# Patient Record
Sex: MALE | Race: WHITE | NOT HISPANIC OR LATINO | Employment: OTHER | URBAN - METROPOLITAN AREA
[De-identification: names, ages, dates, MRNs, and addresses within clinical notes are randomized per-mention and may not be internally consistent; named-entity substitution may affect disease eponyms.]

---

## 2017-02-03 ENCOUNTER — GENERIC CONVERSION - ENCOUNTER (OUTPATIENT)
Dept: OTHER | Facility: OTHER | Age: 62
End: 2017-02-03

## 2017-03-09 ENCOUNTER — ALLSCRIPTS OFFICE VISIT (OUTPATIENT)
Dept: OTHER | Facility: OTHER | Age: 62
End: 2017-03-09

## 2017-04-23 ENCOUNTER — APPOINTMENT (OUTPATIENT)
Dept: POPULATION HEALTH | Facility: CLINIC | Age: 62
End: 2017-04-23
Payer: COMMERCIAL

## 2017-04-23 ENCOUNTER — OUTPATIENT (OUTPATIENT)
Dept: OUTPATIENT SERVICES | Facility: HOSPITAL | Age: 62
LOS: 1 days | End: 2017-04-23
Payer: COMMERCIAL

## 2017-04-23 DIAGNOSIS — Z77.090 CONTACT WITH AND (SUSPECTED) EXPOSURE TO ASBESTOS: ICD-10-CM

## 2017-04-23 PROCEDURE — 93000 ELECTROCARDIOGRAM COMPLETE: CPT

## 2017-04-23 PROCEDURE — 36415 COLL VENOUS BLD VENIPUNCTURE: CPT

## 2017-04-23 PROCEDURE — 71250 CT THORAX DX C-: CPT

## 2017-04-23 PROCEDURE — 99212 OFFICE O/P EST SF 10 MIN: CPT | Mod: 25

## 2017-04-23 PROCEDURE — 94010 BREATHING CAPACITY TEST: CPT

## 2017-04-23 PROCEDURE — 71250 CT THORAX DX C-: CPT | Mod: 26

## 2017-04-27 ENCOUNTER — ALLSCRIPTS OFFICE VISIT (OUTPATIENT)
Dept: OTHER | Facility: OTHER | Age: 62
End: 2017-04-27

## 2017-04-27 LAB — HBA1C MFR BLD HPLC: 6 %

## 2017-05-17 ENCOUNTER — ALLSCRIPTS OFFICE VISIT (OUTPATIENT)
Dept: OTHER | Facility: OTHER | Age: 62
End: 2017-05-17

## 2017-05-22 ENCOUNTER — ALLSCRIPTS OFFICE VISIT (OUTPATIENT)
Dept: OTHER | Facility: OTHER | Age: 62
End: 2017-05-22

## 2017-06-13 ENCOUNTER — GENERIC CONVERSION - ENCOUNTER (OUTPATIENT)
Dept: OTHER | Facility: OTHER | Age: 62
End: 2017-06-13

## 2017-08-03 ENCOUNTER — ALLSCRIPTS OFFICE VISIT (OUTPATIENT)
Dept: OTHER | Facility: OTHER | Age: 62
End: 2017-08-03

## 2017-08-03 LAB — HBA1C MFR BLD HPLC: 5.6 %

## 2017-08-14 ENCOUNTER — GENERIC CONVERSION - ENCOUNTER (OUTPATIENT)
Dept: OTHER | Facility: OTHER | Age: 62
End: 2017-08-14

## 2017-10-09 ENCOUNTER — ALLSCRIPTS OFFICE VISIT (OUTPATIENT)
Dept: OTHER | Facility: OTHER | Age: 62
End: 2017-10-09

## 2017-10-10 NOTE — PROGRESS NOTES
Assessment  1  Abscess of skin (682 9) (L02 91)    Plan  Abscess of skin    · Cefadroxil 500 MG Oral Capsule; TAKE 1 CAPSULE TWICE DAILY   · Follow Up if Not Better Evaluation and Treatment  Follow-up  Status: Complete  Done:  77YNB7037 04:28PM   · Gently wash the area with soap and warm water  Dry completely ; Status:Complete;    Done: 69QSQ4805 04:28PM   · Good hand washing is one of the best ways to control the spread of germs ;  Status:Complete;   Done: 91FHO8919 04:28PM   · Remove the dressing in 1 day ; Status:Complete;   Done: 22OAX8442 04:28PM   · Use warm packs 4 times a day for 15 minutes ; Status:Complete;   Done: 88LZE2691  04:28PM   · Call [phone] if: Your temperature is higher than 101F ; Status:Complete;   Done:  71WWH8519 04:28PM   · Seek Immediate Medical Attention if: You have signs of infection in or around the affected  area ; Status:Complete;   Done: 72XRM9382 04:28PM    Chief Complaint  Patient is here today for a cyst on his back, L  Brenner/LPN      History of Present Illness  Abscess, Subcutaneous (Brief): The patient is being seen for an initial evaluation of a subcutaneous abscess  Symptoms:  abscess drainage  The patient is currently asymptomatic  No associated symptoms are reported  The patient is not currently being treated for this problem  Pertinent medical history:  no diabetes mellitus,-no hidradenitis suppurativa,-no HIV infection,-no impaired immunity-and-no injection drug use  Review of Systems    Constitutional: no fever or chills, feels well, no tiredness, no recent weight loss or weight gain  Integumentary: as noted in HPI  Active Problems  1  Abscess of back (682 2) (L02 212)   2  Abscess of skin (682 9) (L02 91)   3  Closed fracture of one rib of right side, initial encounter (807 01) (S22 31XA)   4  Contusion of right shoulder, initial encounter (923 00) (S40 011A)   5  Dizziness (780 4) (R42)   6  Elevated glucose (790 29) (R73 09)   7   Essential hypertension (401  9) (I10)   8  Heart murmur (785 2) (R01 1)   9  Hip fracture, left (820 8) (S72 002A)   10  Hip fracture, right (820 8) (S72 001A)   11  Hypertension (401 9) (I10)   12  Injury of right shoulder (959 2) (S49 91XA)   13  Laceration of elbow, left (881 01) (S51 012A)   14  Left hip pain (719 45) (M25 552)   15  Need for immunization against influenza (V04 81) (Z23)   16  Preoperative clearance (V72 84) (Z01 818)   17  Progressive supranuclear palsy (333 0) (G23 1)   18  Screening for diabetic retinopathy (V80 2) (Z13 5)   19  Sebaceous cyst (706 2) (L72 3)   20  Shoulder injury, right, initial encounter (959 2) (S49 91XA)   21  Visit for suture removal (V58 32) (Z48 02)    Past Medical History  1  History of Bilateral inguinal hernia without obstruction or gangrene, recurrence not   specified (550 92) (K40 20)   2  History of Colon cancer screening (V76 51) (Z12 11)   3  History of Hordeolum externum (373 11) (H00 019)   4  History of Sprain of shoulder and upper arm (840 9) (S43 409A)   5  History of Upper respiratory infection (465 9) (J06 9)  Active Problems And Past Medical History Reviewed: The active problems and past medical history were reviewed and updated today  Family History  Mother    1  Family history of Hypertension  Father    2  Family history of    3  Family history of Heart attack  Family History    4  No family history of mental disorder  Family History Reviewed: The family history was reviewed and updated today  Social History   · Cultural background   · NON-   · Drinks coffee   · Exercises moderately 3 or more times a week   ·    · Never a smoker   · No drug use   · Occasional alcohol use   · Primary spoken language English   · Seeing a dentist   · Social alcohol use (Z78 9)  The social history was reviewed and updated today  Surgical History  1  History of Hernia Repair   2  History of Hip Replacement Left   3  History of Hip Replacement Right   4  History of Spinal Diskectomy   5  History of Spinal Laminotomy   6  History of Treatment Of Hip Fracture  Surgical History Reviewed: The surgical history was reviewed and updated today  Current Meds   1  Ecotrin 325 MG Oral Tablet Delayed Release; Take 1 tablet daily; Therapy: (Recorded:09Mar2017) to Recorded   2  Lotrel 5-20 MG Oral Capsule; TAKE 1 CAPSULE Bedtime; Therapy: (Recorded:30Jan2017) to Recorded   3  Multi-Vitamin TABS; TAKE 1 TABLET DAILY; Therapy: (Recorded:30Jan2017) to Recorded   4  Nuedexta 20-10 MG Oral Capsule; Take 1 capsule twice daily; Therapy: (Recorded:09Mar2017) to Recorded   5  OneTouch Verio In [employer]; TEST 4 TIMES DAILY; Therapy: 68UEJ7518 to (Evaluate:44Dmf1424)  Requested for: 56EYF1962; Last   Rx:[id number] Ordered   6  OneTouch Verio In [employer]; TEST 4 TIMES DAILY; Therapy: 48RTA8814 to (Last Rx:70Dmd2456) Ordered   7  Vitamin D3 1000 UNIT Oral Capsule; take 1 capsule daily; Therapy: (Recorded:30Jan2017) to Recorded    The medication list was reviewed and updated today  Allergies  1  No Known Drug Allergies    Vitals   Recorded: 65RQE7770 03:50PM   Temperature 99 2 F   Heart Rate 88, L Radial   Pulse Quality Normal, L Radial   Respiration Quality Normal   Respiration 16   Systolic 209, RUE, Sitting   Diastolic 70, RUE, Sitting   Height 6 ft    O2 Saturation 95     Physical Exam    Constitutional   General appearance: No acute distress, well appearing and well nourished  Skin   Skin and subcutaneous tissue: Abnormal  -Very small abscess left upper posterior thorax     Psychiatric   Orientation to person, place and time: Normal     Mood and affect: Normal          Signatures   Electronically signed by : Garrett Leahy MD; Oct  9 2017  4:31PM EST                       (Author)

## 2018-01-11 NOTE — RESULT NOTES
Message   Please notify lyme negative  FS     Verified Results  (LC) Lyme, Western Blot, Serum 04Vgq8155 09:46AM Clotilda Lack     Test Name Result Flag Reference   IgG P93 Ab  Absent     IgG P66 Ab  Absent     IgG P58 Ab  Absent     IgG P45 Ab  Absent     IgG P41 Ab  Absent     IgG P39 Ab  Absent     IgM P39 Ab  Absent     IgM P23 Ab  Absent     Lyme IgM WB Interp  Negative     Note: An equivocal or positive EIA result followed by a negative  Western Blot result is considered NEGATIVE  An equivocal or positive  EIA result followed by a positive Western Blot is considered POSITIVE  by the CDC  Positive: 2 of the following bands: 23,39 or 41  Negative: No bands or banding patterns which do not meet positive  criteria  Criteria for positivity are those recommended by CDC/ASTPHLD   p23=Osp C, t26=fdiltzkxt  Note:  Sera from individuals with the following may cross react in the  Lyme Western Blot assays: other spirochetal diseases (periodontal  disease, leptospirosis, relapsing fever, yaws, and pinta);  connective autoimmune (Rheumatoid Arthritis and Systemic Lupus  Erythematosus and also individuals with Antinuclear Antibody);  other infections COFFEE [facility] Spotted Fever; Elvin-Barr Virus,  and Cytomegalovirus)  IgG P30 Ab  Absent     IgG P28 Ab  Absent     IgG P23 Ab  Absent     IgG P18 Ab  Absent     Lyme IgG WB Interp  Negative     Positive: 5 of the following                                                Borrelia-specific bands:                                                18,23,28,30,39,41,45,58,                                                66, and 93  Negative: No bands or banding                                                patterns which do not                                                meet positive criteria  IgM P41 Ab   Absent

## 2018-01-12 VITALS
RESPIRATION RATE: 16 BRPM | SYSTOLIC BLOOD PRESSURE: 144 MMHG | OXYGEN SATURATION: 97 % | HEART RATE: 90 BPM | BODY MASS INDEX: 27.09 KG/M2 | TEMPERATURE: 98 F | WEIGHT: 200 LBS | HEIGHT: 72 IN | DIASTOLIC BLOOD PRESSURE: 70 MMHG

## 2018-01-12 VITALS
SYSTOLIC BLOOD PRESSURE: 130 MMHG | DIASTOLIC BLOOD PRESSURE: 82 MMHG | WEIGHT: 204 LBS | TEMPERATURE: 98.9 F | HEART RATE: 96 BPM | RESPIRATION RATE: 20 BRPM

## 2018-01-12 NOTE — PROGRESS NOTES
Assessment    1  Diabetes mellitus (250 00) (E11 9)    Plan  Diabetes mellitus    · Follow-up visit in 3 months Evaluation and Treatment  Follow-up  Status: Hold For -  Scheduling  Requested for: 34IUD1914   · Have your eyes examined by an eye doctor every year ; Status:Complete;   Done:  63IRF4893 01:37PM   · It is important to take good care of your feet if you have diabetes ; Status:Complete;    Done: 48OEH3636 01:37PM   · We recommend that you follow the "Mediterranean diet "; Status:Complete;   Done:  43YPY9264 01:37PM   · *VB - Eye Exam ;every 1 year; Last 35Kmz8711; Next 56HWD9784; Status:Active   · *VB - Foot Exam ;every 1 year; Next 69WHA5564; Status:Active    Chief Complaint  Patient is here today for a follow up on diabetes  Foot exam was done today  lb/lpn      History of Present Illness  The patient states he has been doing well with his Type II Diabetes control since the last visit  Comorbid Illnesses: hypertension  He has no known diabetic complications  He has no significant interval events  Symptoms: The patient is currently asymptomatic  Associated symptoms include no polyuria and no polydipsia  Home monitoring: Glycemic control has been good  the patient reports no symptomatic hypoglycemic episodes  Medications: the patient remains on diet control only without medications at this time  The patient is doing well with his diabetes goals  Due For: a hemoglobin A1c and a foot exam       Review of Systems    Cardiovascular: No complaints of slow heart rate, no fast heart rate, no chest pain, no palpitations, no leg claudication, no lower extremity  Respiratory: No complaints of shortness of breath, no wheezing, no cough, no SOB on exertion, no orthopnea or PND  Neurological: No compliants of headache, no confusion, no convulsions, no numbness or tingling, no dizziness or fainting, no limb weakness, no difficulty walking  Active Problems    1  Abscess of back (682 2) (L02 212)   2  Abscess of skin (682 9) (L02 91)   3  Closed fracture of one rib of right side, initial encounter (807 01) (S22 31XA)   4  Contusion of right shoulder, initial encounter (923 00) (S40 011A)   5  Diabetes mellitus (250 00) (E11 9)   6  Dizziness (780 4) (R42)   7  Elevated glucose (790 29) (R73 09)   8  Essential hypertension (401 9) (I10)   9  Heart murmur (785 2) (R01 1)   10  Hip fracture, left (820 8) (S72 002A)   11  Hip fracture, right (820 8) (S72 001A)   12  Hypertension (401 9) (I10)   13  Injury of right shoulder (959 2) (S49 91XA)   14  Left hip pain (719 45) (M25 552)   15  Need for immunization against influenza (V04 81) (Z23)   16  Preoperative clearance (V72 84) (Z01 818)   17  Progressive supranuclear palsy (333 0) (G23 1)   18  Screening for diabetic retinopathy (V80 2) (Z13 5)   19  Sebaceous cyst (706 2) (L72 3)   20  Shoulder injury, right, initial encounter (959 2) (S49 91XA)    Past Medical History    1  History of Bilateral inguinal hernia without obstruction or gangrene, recurrence not   specified (550 92) (K40 20)   2  History of Colon cancer screening (V76 51) (Z12 11)   3  History of Hordeolum externum (373 11) (H00 019)   4  History of Sprain of shoulder and upper arm (840 9) (S43 409A)   5  History of Upper respiratory infection (465 9) (J06 9)    The active problems and past medical history were reviewed and updated today  Surgical History    1  History of Hernia Repair   2  History of Hip Replacement Left   3  History of Hip Replacement Right   4  History of Spinal Diskectomy   5  History of Spinal Laminotomy   6  History of Treatment Of Hip Fracture    The surgical history was reviewed and updated today  Family History  Mother    1  Family history of Hypertension  Father    2  Family history of    3  Family history of Heart attack  Family History    4  No family history of mental disorder    The family history was reviewed and updated today         Social History    · Cultural background   · Drinks coffee   · Exercises moderately 3 or more times a week   ·    · Never a smoker   · No drug use   · Occasional alcohol use   · Primary spoken language English   · Seeing a dentist   · Social alcohol use (Z78 9)  The social history was reviewed and updated today  Current Meds   1  Ecotrin 325 MG Oral Tablet Delayed Release; Take 1 tablet daily; Therapy: (Recorded:09Mar2017) to Recorded   2  Fortamet 1000 MG Oral Tablet Extended Release 24 Hour; Take 1 tablet twice daily; Therapy: (Recorded:30Jan2017) to Recorded   3  Lotrel 5-20 MG Oral Capsule; TAKE 1 CAPSULE Bedtime; Therapy: (Recorded:30Jan2017) to Recorded   4  Multi-Vitamin TABS; TAKE 1 TABLET DAILY; Therapy: (Recorded:30Jan2017) to Recorded   5  Nuedexta 20-10 MG Oral Capsule; Take 1 capsule twice daily; Therapy: (Recorded:09Mar2017) to Recorded   6  OneTouch Verio In [employer]; TEST 4 TIMES DAILY; Therapy: 33FUJ9512 to (Evaluate:44Rvh6620)  Requested for: 15SCO8963; Last   Rx:[id number] Ordered   7  OneTouch Verio In [employer]; TEST 4 TIMES DAILY; Therapy: 76GUH2604 to (Last Rx:81Knt2563) Ordered   8  Tamsulosin HCl - 0 4 MG Oral Capsule; TAKE 2 CAPSULE Daily; Therapy: (Recorded:29Nov2016) to Recorded   9  Vitamin D3 1000 UNIT Oral Capsule; take 1 capsule daily; Therapy: (Recorded:30Jan2017) to Recorded    The medication list was reviewed and updated today  Allergies    1   No Known Drug Allergies    Vitals  Vital Signs    Recorded: 46IZP2387 12:59PM   Temperature 98 9 F, Tympanic    Heart Rate 96, L Radial    Pulse Quality Normal, L Radial    Respiration Quality Normal    Respiration 20    Systolic 489, LUE, Sitting    Diastolic 82, LUE, Sitting    Weight 204 lb     BMI Calculated 3984 11    BSA Calculated 0 35    Patient Refused Height No No   Patient Refused Weight No No     Physical Exam    Psychiatric   Orientation to person, place and time: Normal     Mood and affect: Normal     Diabetic Foot Screen: Normal       Socks and shoes removed, the Right Foot: the foot was normal, no swelling, no erythema  The toes on the right were normal    The sensory exam showed  normal vibratory sensation at the level of the toes on the right  Normal tactile sensation with monofilament testing throughout the right foot  Socks and shoes removed, Left Foot: the foot was normal, no swelling, no erythema  The toes on the left were normal    The sensory exam showed  normal vibratory sensation at the level of the toes on the left  Normal tactile sensation with monofilament testing throughout the left foot  Assign Risk Category: 0: No loss of protective sensation, no deformity  No present risk        Signatures   Electronically signed by : Nahid Kidd MD; Mar  9 2017  1:40PM EST                       (Author)

## 2018-01-13 VITALS
HEART RATE: 90 BPM | BODY MASS INDEX: 27.77 KG/M2 | TEMPERATURE: 98.7 F | WEIGHT: 205 LBS | OXYGEN SATURATION: 95 % | DIASTOLIC BLOOD PRESSURE: 82 MMHG | RESPIRATION RATE: 16 BRPM | SYSTOLIC BLOOD PRESSURE: 140 MMHG | HEIGHT: 72 IN

## 2018-01-13 VITALS
WEIGHT: 199 LBS | RESPIRATION RATE: 16 BRPM | OXYGEN SATURATION: 97 % | BODY MASS INDEX: 26.95 KG/M2 | HEIGHT: 72 IN | SYSTOLIC BLOOD PRESSURE: 110 MMHG | TEMPERATURE: 98.2 F | HEART RATE: 87 BPM | DIASTOLIC BLOOD PRESSURE: 82 MMHG

## 2018-01-13 VITALS
SYSTOLIC BLOOD PRESSURE: 130 MMHG | BODY MASS INDEX: 26.95 KG/M2 | WEIGHT: 199 LBS | HEART RATE: 80 BPM | TEMPERATURE: 99.1 F | HEIGHT: 72 IN | RESPIRATION RATE: 16 BRPM | DIASTOLIC BLOOD PRESSURE: 90 MMHG

## 2018-01-13 VITALS
RESPIRATION RATE: 16 BRPM | DIASTOLIC BLOOD PRESSURE: 70 MMHG | TEMPERATURE: 99.2 F | SYSTOLIC BLOOD PRESSURE: 120 MMHG | HEIGHT: 72 IN | OXYGEN SATURATION: 95 % | HEART RATE: 88 BPM

## 2018-01-15 NOTE — RESULT NOTES
Message   Please notify A1c is excellent  Fax to [facility]     Verified Results  (1) HEMOGLOBIN A1C 63Peh2290 01:05PM Eileen Kurt     Test Name Result Flag Reference   Hemoglobin A1c 5 9 % H 4 8-5 6   Pre-diabetes: 5 7 - 6 4           Diabetes: >6 4           Glycemic control for adults with diabetes: <7 0

## 2018-01-16 NOTE — PROGRESS NOTES
Assessment   1  Encounter for preventive health examination (V70 0) (Z00 00)  2  Progressive supranuclear palsy (333 0) (G23 1)    Plan  Diabetes mellitus    · Hemoglobin A1c- POC; Status:Resulted - Requires Verification,Retrospective  Authorization;   Done: 08JTR0185 01:57PM  Health Maintenance    · Follow-up visit in 1 year Evaluation and Treatment  Follow-up  Status: Hold For -  Scheduling  Requested for: 27Apr2017   · Always use a seat belt and shoulder strap when riding or driving a motor vehicle ;  Status:Complete;   Done: 79XDW3174 02:35PM   · Begin a limited exercise program ; Status:Complete;   Done: 43KNI4317 02:35PM   · Use a sun block product with an SPF of 15 or more ; Status:Complete;   Done:  21EQO3024 02:35PM   · We recommend that you follow the "Mediterranean diet "; Status:Complete;   Done:  87Cvv1645 02:35PM    Discussion/Summary  Impression: health maintenance visit  Currently, he eats a healthy diet  Prostate cancer screening: prostate cancer screening is managed by Urology  Testicular cancer screening: monthly self testicular exam was advised  Colorectal cancer screening: colorectal cancer screening is current  The immunizations are up to date  Advice and education were given regarding sunscreen use and seat belt use  Chief Complaint  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, L  Brenner/LPN      History of Present Illness  HM, Adult Male: The patient is being seen for a health maintenance evaluation  The last health maintenance visit was 12 month(s) ago  Social History: Household members include spouse  He is   Work status: Retired  The patient has never smoked cigarettes  He reports rare alcohol use  He has never used illicit drugs  General Health: The patient's health since the last visit is described as good  He has regular dental visits  He denies vision problems  He denies hearing loss  Immunizations status: up to date  Lifestyle:   He consumes a diverse and healthy diet  He does not have any weight concerns  He does not exercise regularly  He does not use tobacco  He denies drug use  Screening: Prostate cancer screening includes prostate-specific antigen testing last year  Testicular cancer screening includes monthly self testicular examinations  Colorectal cancer screening includes a colonoscopy within the past ten years  Metabolic screening includes lipid profile performed within the past five years, glucose screening performed last year and thyroid function test performed last year  Cardiovascular risk factors: hypertension and diabetes, but no high LDL cholesterol, no low HDL cholesterol, no stress, no obesity, no tobacco use, no illicit drug use and no sedentary lifestyle  General health risks: no elevated prostate-specific antigen, no undescended testis, no previous colon polyps, no inflammatory bowel disease, no osteoporosis risk factors, no asbestos exposure and no radiation exposure  Safety elements used: seat belt, smoke detector and carbon monoxide detector  Review of Systems    Constitutional: No fever or chills, feels well, no tiredness, no recent weight gain or weight loss  Eyes: No complaints of eye pain, no red eyes, no discharge from eyes, no itchy eyes  ENT: no complaints of earache, no hearing loss, no nosebleeds, no nasal discharge, no sore throat, no hoarseness  Cardiovascular: No complaints of slow heart rate, no fast heart rate, no chest pain, no palpitations, no leg claudication, no lower extremity  Respiratory: No complaints of shortness of breath, no wheezing, no cough, no SOB on exertion, no orthopnea or PND  Gastrointestinal: No complaints of abdominal pain, no constipation, no nausea or vomiting, no diarrhea or bloody stools  Genitourinary: No complaints of dysuria, no incontinence, no hesitancy, no nocturia, no genital lesion, no testicular pain     Musculoskeletal: No complaints of arthralgia, no myalgias, no joint swelling or stiffness, no limb pain or swelling  Integumentary: No complaints of skin rash or skin lesions, no itching, no skin wound, no dry skin  Neurological: dizziness, limb weakness and difficulty walking, but No compliants of headache, no confusion, no convulsions, no numbness or tingling, no dizziness or fainting, no limb weakness, no difficulty walking  Psychiatric: Is not suicidal, no sleep disturbances, no anxiety or depression, no change in personality, no emotional problems  Endocrine: No complaints of proptosis, no hot flashes, no muscle weakness, no erectile dysfunction, no deepening of the voice, no feelings of weakness  Hematologic/Lymphatic: No complaints of swollen glands, no swollen glands in the neck, does not bleed easily, no easy bruising  Active Problems   1  Abscess of back (682 2) (L02 212)  2  Abscess of skin (682 9) (L02 91)  3  Closed fracture of one rib of right side, initial encounter (807 01) (S22 31XA)  4  Contusion of right shoulder, initial encounter (923 00) (S40 011A)  5  Diabetes mellitus (250 00) (E11 9)  6  Dizziness (780 4) (R42)  7  Elevated glucose (790 29) (R73 09)  8  Essential hypertension (401 9) (I10)  9  Heart murmur (785 2) (R01 1)  10  Hip fracture, left (820 8) (S72 002A)  11  Hip fracture, right (820 8) (S72 001A)  12  Hypertension (401 9) (I10)  13  Injury of right shoulder (959 2) (S49 91XA)  14  Left hip pain (719 45) (M25 552)  15  Need for immunization against influenza (V04 81) (Z23)  16  Preoperative clearance (V72 84) (Z01 818)  17  Progressive supranuclear palsy (333 0) (G23 1)  18  Screening for diabetic retinopathy (V80 2) (Z13 5)  19  Sebaceous cyst (706 2) (L72 3)  20   Shoulder injury, right, initial encounter (959 2) (S49 91XA)    Past Medical History    · History of Bilateral inguinal hernia without obstruction or gangrene, recurrence not  specified (550 92) (K40 20)   · History of Colon cancer screening (V76 51) (Z12 11)   · History of Hordeolum externum (373 11) (H00 019)   · History of Sprain of shoulder and upper arm (840 9) (S43 409A)   · History of Upper respiratory infection (465 9) (J06 9)    Surgical History    · History of Hernia Repair   · History of Hip Replacement Left   · History of Hip Replacement Right   · History of Spinal Diskectomy   · History of Spinal Laminotomy   · History of Treatment Of Hip Fracture    Family History  Mother    · Family history of Hypertension  Father    · Family history of    · Family history of Heart attack  Family History    · No family history of mental disorder    Social History    · Cultural background   · NON-   · Drinks coffee   · Exercises moderately 3 or more times a week   ·    · Never a smoker   · No drug use   · Occasional alcohol use   · Primary spoken language English   · Seeing a dentist   · Social alcohol use (Z78 9)    Current Meds  1  Ecotrin 325 MG Oral Tablet Delayed Release; Take 1 tablet daily; Therapy: (Recorded:2017) to Recorded  2  Lotrel 5-20 MG Oral Capsule; TAKE 1 CAPSULE Bedtime; Therapy: (Recorded:2017) to Recorded  3  Multi-Vitamin TABS; TAKE 1 TABLET DAILY; Therapy: (Recorded:2017) to Recorded  4  Nuedexta 20-10 MG Oral Capsule; Take 1 capsule twice daily; Therapy: (Recorded:2017) to Recorded  5  OneTouch Verio In [employer]; TEST 4 TIMES DAILY; Therapy: 32NDE2177 to (Evaluate:49Cih0454)  Requested for: 78WIO8603; Last   Rx:77Tli0377 Ordered  6  OneTouch Verio In [employer]; TEST 4 TIMES DAILY; Therapy: 99XNK0274 to (Last Rx:58Vrb0025) Ordered  7  Tamsulosin HCl - 0 4 MG Oral Capsule; TAKE 2 CAPSULE Daily; Therapy: (Recorded:2016) to Recorded  8  Vitamin D3 1000 UNIT Oral Capsule; take 1 capsule daily; Therapy: (Recorded:2017) to Recorded    Allergies   1   No Known Drug Allergies    Vitals   Recorded: 2017 01:46PM   Temperature 98 7 F, Tympanic   Heart Rate 90, L Radial   Pulse Quality Normal, L Radial   Respiration Quality Normal   Respiration 16   Systolic 271, LUE, Sitting   Diastolic 82, LUE, Sitting   Height 6 ft    Weight 205 lb    BMI Calculated 27 8   BSA Calculated 2 15   O2 Saturation 95     Physical Exam    Constitutional   General appearance: No acute distress, well appearing and well nourished  Eyes   Conjunctiva and lids: No erythema, swelling or discharge  Pupils and irises: Equal, round, reactive to light  Ophthalmoscopic examination: Normal fundi and optic discs  Ears, Nose, Mouth, and Throat   External inspection of ears and nose: Normal     Otoscopic examination: Tympanic membranes translucent with normal light reflex  Canals patent without erythema  Hearing: Normal     Nasal mucosa, septum, and turbinates: Normal without edema or erythema  Lips, teeth, and gums: Normal, good dentition  Oropharynx: Normal with no erythema, edema, exudate or lesions  Neck   Neck: Supple, symmetric, trachea midline, no masses  Thyroid: Normal, no thyromegaly  Pulmonary   Respiratory effort: No increased work of breathing or signs of respiratory distress  Auscultation of lungs: Clear to auscultation  Cardiovascular   Auscultation of heart: Normal rate and rhythm, normal S1 and S2, no murmurs  Examination of extremities for edema and/or varicosities: Normal     Abdomen   Abdomen: Non-tender, no masses  Liver and spleen: No hepatomegaly or splenomegaly  Examination for hernias: No hernias appreciated  Musculoskeletal   Gait and station: Normal     Inspection/palpation of digits and nails: Normal without clubbing or cyanosis  Inspection/palpation of joints, bones, and muscles: Normal     Range of motion: Normal     Stability: Normal     Muscle strength/tone: Normal     Skin   Skin and subcutaneous tissue: Normal without rashes or lesions  Palpation of skin and subcutaneous tissue: Normal turgor  Neurologic   Cranial nerves: Cranial nerves 2-12 intact  Psychiatric   Judgment and insight: Normal     Orientation to person, place and time: Normal     Recent and remote memory: Intact  Mood and affect: Normal        Results/Data  Hemoglobin A1c- POC 27Apr2017 01:57PM Xander Thakur     Test Name Result Flag Reference   HEMOGLOBIN A1C 6 0                     Health Management  Diabetes mellitus   *VB - Eye Exam; every 1 year; Last 27Apr2016; Next Due: 62HLC7055; Overdue  *VB - Foot Exam; every 1 year; Last 53IFF6491; Next Due: 36TCV6881;  Active    Signatures   Electronically signed by : Kalia Walden MD; Apr 27 2017  3:03PM EST                       (Author)

## 2018-01-17 NOTE — MISCELLANEOUS
Chief Complaint  Chief Complaint Free Text Note Form: 06/13/2017 Telephone communication call to patient  Lmom for patient to return my call for hospital follow up Simeon Marsh, 06/05/2017 to 06/09/2017 for Left Hip Fracture, L  Brenner/LPN  28/22/0421 Telephone communication call to patient  Lmom for patient to return my call, 1  L  Brenner/LPN       1 Amended ByCarmen Stafford; Jun 14 2017 5:27 PM EST    Active Problems   1  Abscess of back (682 2) (L02 212)  2  Abscess of skin (682 9) (L02 91)  3  Closed fracture of one rib of right side, initial encounter (807 01) (S22 31XA)  4  Contusion of right shoulder, initial encounter (923 00) (S40 011A)  5  Diabetes mellitus (250 00) (E11 9)  6  Dizziness (780 4) (R42)  7  Elevated glucose (790 29) (R73 09)  8  Essential hypertension (401 9) (I10)  9  Heart murmur (785 2) (R01 1)  10  Hip fracture, left (820 8) (S72 002A)  11  Hip fracture, right (820 8) (S72 001A)  12  Hypertension (401 9) (I10)  13  Injury of right shoulder (959 2) (S49 91XA)  14  Laceration of elbow, left (881 01) (S51 012A)  15  Left hip pain (719 45) (M25 552)  16  Need for immunization against influenza (V04 81) (Z23)  17  Preoperative clearance (V72 84) (Z01 818)  18  Progressive supranuclear palsy (333 0) (G23 1)  19  Screening for diabetic retinopathy (V80 2) (Z13 5)  20  Sebaceous cyst (706 2) (L72 3)  21  Shoulder injury, right, initial encounter (959 2) (S49 91XA)  22  Visit for suture removal (V58 32) (Z48 02)    Past Medical History   1  History of Bilateral inguinal hernia without obstruction or gangrene, recurrence not   specified (550 92) (K40 20)  2  History of Colon cancer screening (V76 51) (Z12 11)  3  History of Hordeolum externum (373 11) (H00 019)  4  History of Sprain of shoulder and upper arm (840 9) (S43 409A)  5  History of Upper respiratory infection (465 9) (J06 9)    Surgical History   1  History of Hernia Repair  2  History of Hip Replacement Left  3   History of Hip Replacement Right  4  History of Spinal Diskectomy  5  History of Spinal Laminotomy  6  History of Treatment Of Hip Fracture    Family History  Mother   1  Family history of Hypertension  Father   2  Family history of   3  Family history of Heart attack  Family History   4  No family history of mental disorder    Social History    · Cultural background   · Drinks coffee   · Exercises moderately 3 or more times a week   ·    · Never a smoker   · No drug use   · Occasional alcohol use   · Primary spoken language English   · Seeing a dentist   · Social alcohol use (Z78 9)    Current Meds  1  Ecotrin 325 MG Oral Tablet Delayed Release; Take 1 tablet daily; Therapy: (Recorded:2017) to Recorded  2  Lotrel 5-20 MG Oral Capsule; TAKE 1 CAPSULE Bedtime; Therapy: (Recorded:2017) to Recorded  3  Multi-Vitamin TABS; TAKE 1 TABLET DAILY; Therapy: (Recorded:2017) to Recorded  4  Nuedexta 20-10 MG Oral Capsule; Take 1 capsule twice daily; Therapy: (Recorded:2017) to Recorded  5  OneTouch Verio In [employer]; TEST 4 TIMES DAILY; Therapy: 95YGJ2400 to (Evaluate:29Bnk6052)  Requested for: 72ONZ2439; Last   Rx:[id number] Ordered  6  OneTouch Verio In [employer]; TEST 4 TIMES DAILY; Therapy: 95ANQ3202 to (Last Rx:53Ejv2091) Ordered  7  Tamsulosin HCl - 0 4 MG Oral Capsule; TAKE 2 CAPSULE Daily; Therapy: (Recorded:2016) to Recorded  8  Vitamin D3 1000 UNIT Oral Capsule; take 1 capsule daily; Therapy: (Recorded:2017) to Recorded    Allergies   1  No Known Drug Allergies    Health Management  Diabetes mellitus   *VB - Eye Exam; every 1 year; Last 2016; Next Due: 89FNC9913; Overdue  *VB - Foot Exam; every 1 year; Last 51CSU7053; Next Due: 98PUN2997;  Active    Future Appointments    Date/Time Provider Specialty Site   2017 01:00 PM Noel Castellanos MD Family Medicine  [facility]     Signatures   Electronically signed by : Phil Haywood MD;  2017  8:51AM EST                       (Author)    Electronically signed by : Juliette Pond MD; Pro 15 2017  8:59AM EST                       (Author)    Electronically signed by : Juliette Pond MD; Jun 16 2017  3:23PM EST                       (Author)

## 2018-01-22 VITALS
TEMPERATURE: 97.8 F | HEIGHT: 60 IN | OXYGEN SATURATION: 97 % | BODY MASS INDEX: 39.27 KG/M2 | DIASTOLIC BLOOD PRESSURE: 70 MMHG | RESPIRATION RATE: 16 BRPM | WEIGHT: 200 LBS | HEART RATE: 88 BPM | SYSTOLIC BLOOD PRESSURE: 130 MMHG

## 2018-09-24 DIAGNOSIS — I10 ESSENTIAL HYPERTENSION: Primary | ICD-10-CM

## 2018-09-24 RX ORDER — AMLODIPINE BESYLATE AND BENAZEPRIL HYDROCHLORIDE 5; 10 MG/1; MG/1
CAPSULE ORAL
Qty: 90 CAPSULE | Refills: 3 | Status: SHIPPED | OUTPATIENT
Start: 2018-09-24

## 2021-04-23 ENCOUNTER — TELEPHONE (OUTPATIENT)
Dept: FAMILY MEDICINE CLINIC | Facility: CLINIC | Age: 66
End: 2021-04-23

## 2021-05-07 NOTE — TELEPHONE ENCOUNTER
05/07/21 12:32 PM     Thank you for your request  Your request has been received, reviewed, and the patient chart updated  The PCP has successfully been removed with a patient attribution note  This message will now be completed      Thank you  Juan Carlos Sparks